# Patient Record
Sex: MALE | Employment: OTHER | URBAN - METROPOLITAN AREA
[De-identification: names, ages, dates, MRNs, and addresses within clinical notes are randomized per-mention and may not be internally consistent; named-entity substitution may affect disease eponyms.]

---

## 2018-01-23 ENCOUNTER — HOSPITAL ENCOUNTER (EMERGENCY)
Age: 38
Discharge: HOME | End: 2018-01-23
Attending: EMERGENCY MEDICINE
Payer: MEDICARE

## 2018-01-23 ENCOUNTER — APPOINTMENT (OUTPATIENT)
Dept: GENERAL RADIOLOGY | Age: 38
End: 2018-01-23
Attending: EMERGENCY MEDICINE
Payer: MEDICARE

## 2018-01-23 VITALS
TEMPERATURE: 97.7 F | SYSTOLIC BLOOD PRESSURE: 141 MMHG | DIASTOLIC BLOOD PRESSURE: 68 MMHG | BODY MASS INDEX: 39.28 KG/M2 | WEIGHT: 290 LBS | HEIGHT: 72 IN | HEART RATE: 74 BPM | OXYGEN SATURATION: 100 % | RESPIRATION RATE: 20 BRPM

## 2018-01-23 DIAGNOSIS — M70.51 INFRAPATELLAR BURSITIS OF RIGHT KNEE: Primary | ICD-10-CM

## 2018-01-23 PROCEDURE — A9270 NON-COVERED ITEM OR SERVICE: HCPCS | Performed by: EMERGENCY MEDICINE

## 2018-01-23 PROCEDURE — 73564 X-RAY EXAM KNEE 4 OR MORE: CPT

## 2018-01-23 PROCEDURE — 74011636637 HC RX REV CODE- 636/637: Performed by: EMERGENCY MEDICINE

## 2018-01-23 PROCEDURE — 99283 EMERGENCY DEPT VISIT LOW MDM: CPT

## 2018-01-23 RX ORDER — PREDNISONE 20 MG/1
60 TABLET ORAL
Status: COMPLETED | OUTPATIENT
Start: 2018-01-23 | End: 2018-01-23

## 2018-01-23 RX ORDER — PREDNISONE 20 MG/1
60 TABLET ORAL DAILY
Qty: 15 TAB | Refills: 0 | Status: SHIPPED | OUTPATIENT
Start: 2018-01-23 | End: 2018-01-28

## 2018-01-23 RX ADMIN — PREDNISONE 60 MG: 20 TABLET ORAL at 08:01

## 2018-01-23 NOTE — ED TRIAGE NOTES
Presented to ED to be evaluated for reported right knee injury while using eliptical on yesterday. Patient reports pain as documented. Patient is able to ambulate with minimal difficulty noted. Patient denies any additional complaints at this time. Noted that patient has splint to left 1st and 2nd digits. Sepsis Screening completed    (  )Patient meets SIRS criteria. ( zx )Patient does not meet SIRS criteria.       SIRS Criteria is achieved when two or more of the following are present   Temperature < 96.8°F (36°C) or > 100.9°F (38.3°C)   Heart Rate > 90 beats per minute   Respiratory Rate > 20 breaths per minute   WBC count > 12,000 or <4,000 or > 10% bands

## 2018-01-23 NOTE — DISCHARGE INSTRUCTIONS
Bursitis: Care Instructions  Your Care Instructions    A bursa is a small sac of fluid that helps the tissues around a joint slide over one another easily. Injury or overuse of a joint can cause pain, redness, and inflammation in the bursa (bursitis). Bursitis usually gets better if you avoid the activity that caused it. You can help prevent bursitis from coming back by doing stretching and strengthening exercises. You may also need to change the way you do some activities. Follow-up care is a key part of your treatment and safety. Be sure to make and go to all appointments, and call your doctor if you are having problems. It's also a good idea to know your test results and keep a list of the medicines you take. How can you care for yourself at home? · Put ice or a cold pack on the area for 10 to 20 minutes at a time. Try to do this every 1 to 2 hours for the next 3 days (when you are awake) or until the swelling goes down. Put a thin cloth between the ice and your skin. · After the 3 days of using ice, you may use heat on the area. You can use a hot water bottle; a warm, moist towel; or a heating pad set on low. You can also try alternating heat and ice. · Rest the area where you have pain. Stop any activities that cause pain. Switch to activities that do not stress the area. · Take pain medicines exactly as directed. ¨ If the doctor gave you a prescription medicine for pain, take it as prescribed. ¨ If you are not taking a prescription pain medicine, ask your doctor if you can take an over-the-counter medicine. ¨ Do not take two or more pain medicines at the same time unless the doctor told you to. Many pain medicines have acetaminophen, which is Tylenol. Too much acetaminophen (Tylenol) can be harmful. · To prevent stiffness, gently move the joint as much as you can without pain every day. As the pain gets better, keep doing range-of-motion exercises.  Ask your doctor for exercises that will make the muscles around the joint stronger. Do these as directed. · You can slowly return to the activity that caused the pain, but do it with less effort until you can do it without pain or swelling. Be sure to warm up before and stretch after you do the activity. When should you call for help? Call your doctor now or seek immediate medical care if:  ? · You have new or worse symptoms of infection, such as:  ¨ Increased pain, swelling, warmth, or redness. ¨ Red streaks leading from the area. ¨ Pus draining from the area. ¨ A fever. ? Watch closely for changes in your health, and be sure to contact your doctor if:  ? · You do not get better as expected. Where can you learn more? Go to http://evonne-michelle.info/. Enter G779 in the search box to learn more about \"Bursitis: Care Instructions. \"  Current as of: March 21, 2017  Content Version: 11.4  © 3990-5550 [employer]. Care instructions adapted under license by [employer] (which disclaims liability or warranty for this information). If you have questions about a medical condition or this instruction, always ask your healthcare professional. [place] [address] any warranty or liability for your use of this information. Overuse and Repetitive Motion Injuries: Care Instructions  Your Care Instructions    When you use one part of your body in the same way over and over again, it can put too much stress on your joints, muscles, or other tissues. This can cause an overuse injury. You may have pain, swelling, or tenderness in that part of your body. There are many different kinds of overuse injuries. You could get one from doing a sport or activity. Or you could get one from something you do at work. For example, you may get elbow pain from frequent lifting. Or you may get wrist pain from typing all day. Follow-up care is a key part of your treatment and safety.  Be sure to make and go to all appointments, and call your doctor if you are having problems. It's also a good idea to know your test results and keep a list of the medicines you take. How can you care for yourself at home? · Take pain medicines exactly as directed. ¨ If the doctor gave you a prescription medicine for pain, take it as prescribed. ¨ If you are not taking a prescription pain medicine, ask your doctor if you can take an over-the-counter medicine. · Put ice or a cold pack on the area for 10 to 20 minutes at a time to ease pain. Put a thin cloth between the ice and your skin. · If your doctor gave you a splint, use it exactly as directed. · Ask your doctor about physical or occupational therapy. These can help you learn how to do tasks differently. · Return to your usual activities slowly. · Rest the area that hurts. You may need to stop or reduce the activity that causes your symptoms. Then you can return to it slowly. When should you call for help? Watch closely for changes in your health, and be sure to contact your doctor if:  ? · Your symptoms are getting worse. ? · You have new symptoms, such as numbness or weakness. ? · You do not get better as expected. Where can you learn more? Go to http://evonne-michelle.info/. Enter H328 in the search box to learn more about \"Overuse and Repetitive Motion Injuries: Care Instructions. \"  Current as of: March 21, 2017  Content Version: 11.4  © 8151-6466 [employer]. Care instructions adapted under license by [employer] (which disclaims liability or warranty for this information). If you have questions about a medical condition or this instruction, always ask your healthcare professional. [address] any warranty or liability for your use of this information.

## 2018-01-23 NOTE — ED PROVIDER NOTES
[address]  EMERGENCY DEPARTMENT HISTORY AND PHYSICAL EXAM    Date: 1/23/2018  Patient Name: Jeniffer Nixon  YOB: 1980  Medical Record Number: [id number]     History of Presenting Illness     Chief Complaint   Patient presents with    Knee Pain       History Provided By: Patient    Chief Complaint: Right knee injury  Duration: 1 Days  Timing:  Acute  Location: Lateral infrapatellar right knee  Quality: Aching and Sharp  Severity: 9 out of 10  Modifying Factors: Pain is worse with bending right knee  Associated Symptoms: Right knee pain and swelling    Additional History (Context):   7:01 AM  Jeniffer Nixon is a 40 y.o. male who presents to the emergency department C/O right knee injury onset yesterday. Associated symptoms include right knee swelling/pain. Pain is worse with bending right knee. Reports he was riding the elliptical and after finishing/stretching, pt began to feel a pain deep inside lateral infrapatellar right knee. Pt denies hearing a \"pop,\" right knee locking up, wound, color change, fhx lupus, fhx RA,  and any other Sx or complaints. Shx: -tobacco use, -EtOH use, -illicit drug use    PCP: PROVIDER UNKNOWN    Past History     Past Medical History:  History reviewed. No pertinent past medical history. Past Surgical History:  History reviewed. No pertinent surgical history. Family History:  History reviewed. No pertinent family history. Social History:  Social History   Substance Use Topics    Smoking status: Never Smoker    Smokeless tobacco: Never Used    Alcohol use No       Allergies:  No Known Allergies      Review of Systems     Review of Systems   Musculoskeletal: Positive for arthralgias (right knee) and joint swelling (right knee). Skin: Negative for color change and wound. All other systems reviewed and are negative.       Physical Exam     Vitals:    01/23/18 0612   BP: 141/68   Pulse: 74   Resp: 20   Temp: 97.7 °F (36.5 °C) SpO2: 100%   Weight: 131.5 kg (290 lb)   Height: 6' (1.829 m)     Physical Exam   Constitutional: He is oriented to person, place, and time. He appears well-developed and well-nourished. No distress. HENT:   Head: Normocephalic and atraumatic. Right Ear: External ear normal.   Left Ear: External ear normal.   Mouth/Throat: Oropharynx is clear and moist. No oropharyngeal exudate. Eyes: Conjunctivae and EOM are normal. Pupils are equal, round, and reactive to light. No scleral icterus. No pallor   Neck: Normal range of motion. Neck supple. No JVD present. No tracheal deviation present. No thyromegaly present. Cardiovascular: Normal rate, regular rhythm and normal heart sounds. Pulmonary/Chest: Effort normal and breath sounds normal. No stridor. No respiratory distress. Abdominal: Soft. Bowel sounds are normal. He exhibits no distension. There is no tenderness. There is no rebound and no guarding. Musculoskeletal: Normal range of motion. He exhibits no edema. Right knee: He exhibits swelling. He exhibits no ecchymosis, no erythema, no LCL laxity, normal patellar mobility and no MCL laxity. Tenderness found. Visible swelling to the lateral infrapatellar region of right knee. No displacement or ballottement of the patella. No valgus or varus laxity or pain. No Landry's. No posterior or anterior drawer sign. Lymphadenopathy:     He has no cervical adenopathy. Neurological: He is alert and oriented to person, place, and time. He has normal reflexes. No cranial nerve deficit. Coordination normal.   Skin: Skin is warm and dry. No rash noted. He is not diaphoretic. No erythema. Psychiatric: He has a normal mood and affect. His behavior is normal. Judgment and thought content normal.   Nursing note and vitals reviewed. Diagnostic Study Results     Labs -   No results found for this or any previous visit (from the past 12 hour(s)).     Radiologic Studies -     RADIOLOGY FINDINGS  Right knee X-ray shows on lateral view of right knee, there is a prominent lucency of tibial tuberosity, which does not appear to be a fx. Pending review by Radiologist  Recorded by JOAQUIN Sy, as dictated by Thai Baeza MD    XR KNEE RT MIN 4 V    (Results Pending)     CT Results  (Last 48 hours)    None        CXR Results  (Last 48 hours)    None          Medications Given in the ED:  Medications   predniSONE (DELTASONE) tablet 60 mg (not administered)        Medical Decision Making     I am the first provider for this patient. I reviewed the vital signs, available nursing notes, past medical history, past surgical history, family history and social history. Records Reviewed: Nursing Notes    Vital Signs-Reviewed the patient's vital signs. Patient Vitals for the past 12 hrs:   Temp Pulse Resp BP SpO2   01/23/18 0612 97.7 °F (36.5 °C) 74 20 141/68 100 %       Provider Notes (Medical Decision Making):   DDx: bursitis more likely than ligamentous injury, arthritis or bony fx. Pulse Oximetry Analysis - Normal 100% on RA       ED Course:   7:01 AM Initial assessment performed. The patients presenting problems have been discussed, and they are in agreement with the care plan formulated and outlined with them. Offering no questions or concerns at this time. Diagnosis and Disposition       Discharge Note:  7:25 AM  Shagufta Mares's  results have been reviewed with him. He has been counseled regarding his diagnosis, treatment, and plan. He verbally conveys understanding and agreement of the signs, symptoms, diagnosis, treatment and prognosis and additionally agrees to follow up as discussed. He also agrees with the care-plan and conveys that all of his questions have been answered.   I have also provided discharge instructions for him that include: educational information regarding their diagnosis and treatment, and list of reasons why they would want to return to the ED prior to their follow-up appointment, should his condition change. He has been provided with education for proper emergency department utilization. Clinical Impression:    1. Infrapatellar bursitis of right knee        PLAN:  1. D/C Home  2. Current Discharge Medication List      START taking these medications    Details   predniSONE (DELTASONE) 20 mg tablet Take 3 Tabs by mouth daily for 5 days. With Breakfast  Qty: 15 Tab, Refills: 0           3. Follow-up Information     Follow up With Details Comments Milla Antonio MD Schedule an appointment as soon as possible for a visit in 2 days For primary care follow up [address] [place] [address]  [phone]      [facility] EMERGENCY DEPT Go to As needed, if symptoms worsen [address] [place] [address]  [phone]        _______________________________    Attestations: This note is prepared by Rachid Biggs, acting as Scribe for Aditya. Starla Jaeger MD.    Aditya. Starla Jaeger MD:  The scribe's documentation has been prepared under my direction and personally reviewed by me in its entirety.   I confirm that the note above accurately reflects all work, treatment, procedures, and medical decision making performed by me.  _______________________________

## 2018-09-09 ENCOUNTER — APPOINTMENT (OUTPATIENT)
Dept: GENERAL RADIOLOGY | Age: 38
End: 2018-09-09
Attending: PHYSICIAN ASSISTANT
Payer: MEDICARE

## 2018-09-09 ENCOUNTER — HOSPITAL ENCOUNTER (EMERGENCY)
Age: 38
Discharge: HOME | End: 2018-09-09
Attending: EMERGENCY MEDICINE
Payer: MEDICARE

## 2018-09-09 VITALS
TEMPERATURE: 98.2 F | WEIGHT: 271 LBS | RESPIRATION RATE: 16 BRPM | DIASTOLIC BLOOD PRESSURE: 86 MMHG | OXYGEN SATURATION: 98 % | BODY MASS INDEX: 38.8 KG/M2 | HEART RATE: 58 BPM | SYSTOLIC BLOOD PRESSURE: 137 MMHG | HEIGHT: 70 IN

## 2018-09-09 DIAGNOSIS — S80.01XA CONTUSION OF RIGHT KNEE, INITIAL ENCOUNTER: Primary | ICD-10-CM

## 2018-09-09 DIAGNOSIS — Z87.39 HISTORY OF BURSITIS: ICD-10-CM

## 2018-09-09 PROCEDURE — 73564 X-RAY EXAM KNEE 4 OR MORE: CPT

## 2018-09-09 PROCEDURE — 99282 EMERGENCY DEPT VISIT SF MDM: CPT

## 2018-09-09 RX ORDER — PREDNISONE 10 MG/1
TABLET ORAL
Qty: 21 TAB | Refills: 0 | Status: SHIPPED | OUTPATIENT
Start: 2018-09-09 | End: 2020-04-09 | Stop reason: ALTCHOICE

## 2018-09-09 RX ORDER — TRAMADOL HYDROCHLORIDE 50 MG/1
50 TABLET ORAL
Qty: 20 TAB | Refills: 0 | Status: SHIPPED | OUTPATIENT
Start: 2018-09-09 | End: 2020-04-09 | Stop reason: ALTCHOICE

## 2018-09-10 NOTE — DISCHARGE INSTRUCTIONS

## 2018-09-10 NOTE — ED PROVIDER NOTES
EMERGENCY DEPARTMENT HISTORY AND PHYSICAL EXAM 
 
Date: 9/9/2018 Patient Name: Candice Mcconnell History of Presenting Illness Chief Complaint Patient presents with  Knee Pain History Provided By: Patient Chief Complaint: Knee pain Duration: 1 Days Timing:  Acute Location: Right knee Quality: Abner Garcia Severity: 8 out of 10 Modifying Factors: No modifying factors Associated Symptoms: Bruising to front of right knee Additional History (Context):  
10:22 PM 
Candice Mcconnell is a 40 y.o. male with no pertinent PMHX who presents to the emergency department C/O right knee pain onset 1 day ago. Associated sxs include bruising to front on right knee. Pt states that he was knelling on his knee today when the pain started. Pt states that he has been seen for similar knee pain and was dx with an inflamed bursa. Pt states that he was given a steroid for 6 days and followed up with his PCP who gave him a Cortizone shot. Pt denies fever, chills, recent fall or injury, and any other sxs or complaints. PCP: PROVIDER UNKNOWN 
 
 
 
Past History Past Medical History: 
Past Medical History:  
Diagnosis Date  Psychiatric disorder Past Surgical History: 
Past Surgical History:  
Procedure Laterality Date  HX HERNIA REPAIR Family History: 
History reviewed. No pertinent family history. Social History: 
Social History Substance Use Topics  Smoking status: Never Smoker  Smokeless tobacco: Never Used  Alcohol use Yes Allergies: Allergies Allergen Reactions  Ancef [Cefazolin] Rash Review of Systems Review of Systems Constitutional: Negative for chills and fever. Musculoskeletal: Positive for arthralgias (right knee). Skin: Positive for color change (bruising to front of right knee). All other systems reviewed and are negative. Physical Exam  
 
Vitals:  
 09/09/18 2237 BP: 137/86 Pulse: (!) 58 Resp: 16 Temp: 98.2 °F (36.8 °C) SpO2: 98% Weight: 122.9 kg (271 lb) Height: 5' 10\" (1.778 m) Physical Exam  
Constitutional: He is oriented to person, place, and time. He appears well-developed and well-nourished. No distress. HENT:  
Head: Normocephalic and atraumatic. Eyes: Conjunctivae and EOM are normal. Pupils are equal, round, and reactive to light. Neck: Normal range of motion. Neck supple. Musculoskeletal: Normal range of motion. He exhibits tenderness. He exhibits no edema or deformity. Right hip: Normal.  
     Right knee: He exhibits ecchymosis. He exhibits normal range of motion, no laceration, no erythema, normal alignment and no LCL laxity. Tenderness found. Right ankle: Normal. He exhibits normal pulse. Right lower leg: Normal.  
     Legs: 
Neurological: He is alert and oriented to person, place, and time. Skin: Skin is warm and dry. Psychiatric: He has a normal mood and affect. His behavior is normal.  
Nursing note and vitals reviewed. Diagnostic Study Results Labs - No results found for this or any previous visit (from the past 12 hour(s)). Radiologic Studies -  
XR KNEE RT MIN 4 V    (Results Pending) RADIOLOGY FINDINGS Right knee X-ray shows no acute process Pending review by Radiologist 
Recorded by [facility], ED Scribe, as dictated by Aaron Loyola PA-C 
 
CT Results  (Last 48 hours) None CXR Results  (Last 48 hours) None Medications given in the ED- Medications - No data to display Medical Decision Making I am the first provider for this patient. I reviewed the vital signs, available nursing notes, past medical history, past surgical history, family history and social history. Vital Signs-Reviewed the patient's vital signs. Pulse Oximetry Analysis - 100% on RA Records Reviewed: Nursing Notes Procedures: 
Procedures ED Course:  
10:22 PM Initial assessment performed.  The patients presenting problems have been discussed, and they are in agreement with the care plan formulated and outlined with them. I have encouraged them to ask questions as they arise throughout their visit. Diagnosis and Disposition DISCHARGE NOTE: 
11:03 PM  
Nelson Mares's  results have been reviewed with him. He has been counseled regarding his diagnosis, treatment, and plan. He verbally conveys understanding and agreement of the signs, symptoms, diagnosis, treatment and prognosis and additionally agrees to follow up as discussed. He also agrees with the care-plan and conveys that all of his questions have been answered. I have also provided discharge instructions for him that include: educational information regarding their diagnosis and treatment, and list of reasons why they would want to return to the ED prior to their follow-up appointment, should his condition change. He has been provided with education for proper emergency department utilization. CLINICAL IMPRESSION: 
 
1. Contusion of right knee, initial encounter 2. History of bursitis PLAN: 
1. D/C Home 2. Current Discharge Medication List  
  
START taking these medications Details  
traMADol (ULTRAM) 50 mg tablet Take 1 Tab by mouth every six (6) hours as needed for Pain. Max Daily Amount: 200 mg. Qty: 20 Tab, Refills: 0 Associated Diagnoses: Contusion of right knee, initial encounter  
  
predniSONE (STERAPRED DS) 10 mg dose pack Use as directed Qty: 21 Tab, Refills: 0 Associated Diagnoses: History of bursitis 3. Follow-up Information Follow up With Details Comments Contact Info Kay Pike MD Schedule an appointment as soon as possible for a visit in 2 days For follow up with orthopedist [address] [place] [address] Rd [address] [facility] 
[phone] [facility] EMERGENCY DEPT Go to As needed, if symptoms worsen [address] [id number] [phone] _______________________________ Attestations: This note is prepared by Makayla Ramsey, acting as Scribe for KALIE Ledezma Ala, Ala, PA-C:  The scribe's documentation has been prepared under my direction and personally reviewed by me in its entirety. I confirm that the note above accurately reflects all work, treatment, procedures, and medical decision making performed by me. 
_______________________________

## 2020-04-09 ENCOUNTER — VIRTUAL VISIT (OUTPATIENT)
Dept: SURGERY | Age: 40
End: 2020-04-09

## 2020-04-09 VITALS
HEART RATE: 87 BPM | DIASTOLIC BLOOD PRESSURE: 89 MMHG | HEIGHT: 70 IN | WEIGHT: 302 LBS | BODY MASS INDEX: 43.23 KG/M2 | SYSTOLIC BLOOD PRESSURE: 137 MMHG

## 2020-04-09 DIAGNOSIS — E66.01 MORBID OBESITY WITH BMI OF 40.0-44.9, ADULT (HCC): ICD-10-CM

## 2020-04-09 DIAGNOSIS — E66.01 MORBID OBESITY (HCC): Primary | ICD-10-CM

## 2020-04-09 DIAGNOSIS — R73.03 PREDIABETES: ICD-10-CM

## 2020-04-09 DIAGNOSIS — E78.5 HYPERLIPIDEMIA, UNSPECIFIED HYPERLIPIDEMIA TYPE: ICD-10-CM

## 2020-04-09 DIAGNOSIS — F41.9 ANXIETY: ICD-10-CM

## 2020-04-09 DIAGNOSIS — F32.A DEPRESSION, UNSPECIFIED DEPRESSION TYPE: ICD-10-CM

## 2020-04-09 RX ORDER — LAMOTRIGINE 25 MG/1
25 TABLET, CHEWABLE ORAL DAILY
COMMUNITY

## 2020-04-09 NOTE — PROGRESS NOTES
Bariatric Surgery Consultation    Subjective: The patient is a 44 y.o. obese male with a Body mass index is 43.33 kg/m². .  The patient is at his heaviest weight for the past 2 years. he has been overweight since age 28.   he has been considering surgery since last year. he desires surgery at this time because of multiple health concerns and their lifestyle issues which are hindered by their weight. he has been referred by his family physician Dr Rajesh Coon for evaluation and treatment of their obesity via surgical intervention. Diogo Browne has tried multiple diets in his lifetime most recently tried physician supervised, behavior modification and Atkins    Bariatric comorbidities present are   Patient Active Problem List   Diagnosis Code    Morbid obesity ([facility] E66.01    Anxiety F41.9    Depression F32.9    Morbid obesity with BMI of 40.0-44.9, adult ([facility] E66.01, Z68.41    Prediabetes R73.03    Hyperlipidemia E78.5       The patient is considering laparoscopic sleeve gastrectomy for surgical weight loss due to their ineffective progress with medical forms of weight loss and the urging of their physician who cares for their primary medical issues. The patient  now presents  for consideration for weight loss surgery understanding the benefits of this over a medical approach of weight loss as was discussed in our presentation on weight loss surgery. They have discussed their plans both with their family and primary care physician who is in support of their pursuit of such. The patient has not had health issues as of late and denies and gastrointestinal disturbances other than what is outlined below in their review of symptoms. All of their prior evaluations available by both their PCP's and specialists physicians have been reviewed today either in the Care Everywhere portal or scanned under the media tab.     I have spent a large portion of my initial consultation today reviewing the patients current dietary habits which have contributed to their health issues and obesity. I have suggested to them personally a dietary regimen that they can initiate now to help with their status as it pertains to their weight. They understand that the most important aspect of their journey through their weight loss endeavor will be their adherence to a new lifestyle of healthy eating behavior. They also understand that an adherence to an exercise program will not only help with weight loss but is ultimately important in weight maintenance. The patients goal weight is 190lb. These goals are consistent with expected outcomes of their desired operation. his Medical goals are resolution of these health issues. Patient Active Problem List    Diagnosis Date Noted    Morbid obesity ([facility]     Anxiety     Depression     Morbid obesity with BMI of 40.0-44.9, adult ([facility]     Prediabetes     Hyperlipidemia      Past Surgical History:   Procedure Laterality Date    HX HERNIA REPAIR      HX WISDOM TEETH EXTRACTION        Social History     Tobacco Use    Smoking status: Never Smoker    Smokeless tobacco: Never Used   Substance Use Topics    Alcohol use: Yes      No family history on file. Current Outpatient Medications   Medication Sig Dispense Refill    lamoTRIgine (LaMICtal) 25 mg rapid dissolve tablet Take 25 mg by mouth daily.        Allergies   Allergen Reactions    Ancef [Cefazolin] Rash          Review of Systems:       General - No history or complaints of unexpected fever, chills, or weight loss  Head/Neck - No history or complaints of headache, diplopia, dysphagia, hearing loss  Cardiac - No history or complaints of chest pain, palpitations, murmur, or shortness of breath  Pulmonary - No history or complaints of shortness of breath, productive cough, hemoptysis  Gastrointestinal - mild reflux,no  abdominal pain, obstipation/constipation or blood per rectum  Genitourinary - No history or complaints of hematuria/dysuria, stress urinary incontinence symptoms, or renal lithiasis  Musculoskeletal - no joint pain ,  no muscular weakness  Hematologic - No history or complaints of bleeding disorders,  No blood transfusions  Neurologic - No history or complaints of  migraine headaches, seizure activity, syncopal episodes, TIA or stroke  Integumentary - No history or complaints of rashes, abnormal nevi, skin cancer  Gynecological - n/a               Objective:     Visit Vitals  /89   Pulse 87   Ht 5' 10\" (1.778 m)   Wt 137 kg (302 lb)   BMI 43.33 kg/m²       Physical Examination:       Physical Examination: General appearance - alert, well appearing, and in no distress and oriented to person, place, and time  Mental status - alert, oriented to person, place, and time, normal mood, behavior, speech, dress, motor activity, and thought processes  Eyes - pupils equal and reactive, extraocular eye movements intact, sclera anicteric, left eye normal, right eye normal  Ears - right ear normal, left ear normal  Neck- good extension and flexion, no obvious swelling  Chest - good air movement  Heart - N/A  Abdomen - no obvious distension, scars as noted. Neurological - alert, oriented, normal speech, no focal findings or movement disorder noted  Musculoskeletal - no swelling noted  Extremities - normal movement    Labs:       No results found for this or any previous visit (from the past 1440 hour(s)). Assessment:     Morbid obesity with comorbidity    Plan:     laparoscopic sleeve gastrectomy    This is a 44 y.o. male with a BMI of Body mass index is 43.33 kg/m². and the weight-related co-morbidties as noted below. Lorena Quan meets the NIH criteria for bariatric surgery based upon the BMI of Body mass index is 43.33 kg/m². and multiple weight-related co-morbidties.  Lorena Quan has elected laparoscopic sleeve gastrectomy as his intervention of choice for treatment of morbid obestiy through surgical means secondary to its safety profile, rapid return to work  and decreases in operative risks over gastric bypass. In the office today, following Julito's history and physical examination, a 30 minute discussion regarding the anatomic alterations for the laparoscopic sleeve gastrectomy was undertaken. The dietary expectations and the patient and physician dependent factors for success were thoroughly discussed, to include the need for interval follow-up and long-term dietary changes associated with success. The possible complications of the sleeve gastrectomy  were also discussed, to include;death, DVT/PE, staple line leak, bleeding, stricture formation, infection, nutritional deficiencies and sleeve dilation. Specific weight related outcomes for success were also discussed with an emphasis on careful and close follow-up with the first year and eating behavior modification as the baseline and cyclical hunger return. The patient expressed an understanding of the above factors, and his questions were answered in their entirety. In addition, the patient attended a 1.5 hour power point seminar regarding obesity, surgical weight loss including, adjustable gastric band, gastric bypass, and sleeve gastrectomy. This discussion contrasted the different surgical techniques, mechanisms of actions and expected outcomes, and surgical and medical risks associated with each procedure. During this seminar, there was a long question and answer session where each questions was answered until there were no additional questions. Today, the patient had all of his questions answered and desires to proceed with  bariatric surgery initially choosing sleeve gastrectomy as his surgical option. Secondary Diagnoses:     Dietary Intervention  - The patient is currently scheduled to see or has been followed by a bariatric nutritionist for an attempt at preoperative weight loss as has been dictated by their insurance carrier.     They will be assessed at various times during their follow up to evaluate their progress depending on the length of time that is required once again by their carrier. I have explained the importance of   preoperative weight loss and the benefits regarding lower surgical risk and also assisting the patient in reaching their weight loss goal. They understand also that they should participate in an  exercise program to assist in this weight loss. Finally they understand there is a physiologic benefit from the standpoint of hepatic volume reduction and reduction of central visceral adiposity   preoperatively. I have reiterated the importance of a low carbohydrate and high protein regimen to achieve their   stated goal. I have reviewed their current eating behavior prior to this encounter and explained to them in an exhaustive fashion the appropriate diet that they should adhere to. They have been   encouraged to loose weight pre operatively and understand it is our prerogative to cancel surgery or postpone their procedure in the event of significant weight gain. Hyperlipidemia - The patient understands that studies show that almost all patient will realize an improvement in their lipid profile with weight loss that occurs with these procedures. They however also understand that hyperlipidemia is a multifactorial disease particularly as it pertains to their genetic background and that there is no guarantee toward cure  of this   issue. We will resume their medications both pre operatively and immediately postoperatively as this tends to decrease any post operative cardiac events. The patient will follow up   with their family physician in the postoperative period with plans to repeat their lipid panel 2-3 month postoperative for potential adjustment or removal of these medications.     Adult Onset Diabetes - The patient has tanner given a very low carbohydrate diet preoperatively along with instructions to monitor their blood sugars on a regular   daily basis. When  their surgery is performed  we will be monitoring the patient with sliding scale insulin and accuchecks. Based on those values we will determine   whether the patient needs a reduction of those medications postoperatively or total removal of those medications on discharge. We will have the patient continue   accuchecks postoperatively while at home also and report to me or their family physician for appropriate adjustments as needed. The patient also understands   that in the event of uncontrolled blood sugar preoperatively that we may choose to postpone their surgery. GERD -The patient understands that weight loss surgery is not a guaranteed cure for reflux disease but does understand the benefits that weight loss can have on reflux disease. They also understand that at the time of surgery the gastroesophageal junction will be evaluated for the presence of a diaphragmatic hernia. Hernias will be corrected always with the surgical procedure if possible and is deemed safe. The patient also understands that neither weight loss surgery nor repair of a diaphragmatic hernia repair guarantees the complete cessation of the disease. They also understand there is a possibility of recurrence of these hernias with a simple crural repair as is performed with these procedures. They understand they may have to continue their medications in the postoperative period. They have a good understanding that the gastric bypass procedure is better suited to total resolution of this issue and that neither the Lap Band nor sleeve gastrectomy is considered a curative procedure as it pertains to this diagnosis. Signed By: Mor Olivier MD     April 9, 2020       This visit with Avi Sever was performed under virtual telemedicine guidelines during the coronavirus ([id number]-53) public health emergency on 4/9/2020 in an interactive   fashion using [employer] me.   They understand that this telemedicine encounter is a billable service, with coverage determined by their insurance carrier. They are aware that   they may receive a bill and have provided verbal consent for this virtual visit. This visit was performed with the patient in their home environment and provider was   present at [facility]. I have spent over 60 minutes on this visit  both prior to the visits reviewing the patients chart and with the patient face to face. I have reviewed their medical history, performed a telemedicine physical examination, and discussed the plan of action to date. They understand that they will be asked   to come to the office when our office is allowed normal patient interaction, as dictated by public health officials, for a face-to-face visit to rediscuss all of the things we  have talked about today. During this visit we discussed the varieties of surgeries that we perform, how they would impact the patient from a weight loss standpoint   considering their medical issues and prior surgeries, and also the restrictions that the patient would have long-term with the operation that they have chosen.     Wendall Nyhan M.D.  4/9/2020

## 2020-04-09 NOTE — PATIENT INSTRUCTIONS

## 2020-05-04 ENCOUNTER — CLINICAL SUPPORT (OUTPATIENT)
Dept: SURGERY | Age: 40
End: 2020-05-04

## 2020-05-04 VITALS — BODY MASS INDEX: 43.23 KG/M2 | WEIGHT: 302 LBS | HEIGHT: 70 IN

## 2020-05-04 DIAGNOSIS — E66.01 MORBID OBESITY WITH BMI OF 40.0-44.9, ADULT (HCC): Primary | ICD-10-CM

## 2020-05-04 NOTE — PROGRESS NOTES
Medical Weight Loss Multi-Disciplinary Program    Name: Sharif Tam   : 1980    Session# 1  Date: 2020     Height: 5' 10\" (177.8 cm)    Weight: 137 kg (302 lb) lbs. Body mass index is 43.33 kg/m². Dietitian: Salvatore Lin is a 44 y.o. male who present for a pre-op evaluation. Visit Vitals  Ht 5' 10\" (1.778 m)   Wt 137 kg (302 lb)   BMI 43.33 kg/m²     Past Medical History:   Diagnosis Date    Anxiety     Depression     Diabetes ([facility]     GERD (gastroesophageal reflux disease)     Hyperlipidemia     Morbid obesity ([facility]     Morbid obesity with BMI of 40.0-44.9, adult ([facility]     Prediabetes            Procedure:  laparoscopic sleeve gastrectomy     Reasons for Surgery:  BMI > 40 with one or more medically significant comorbidities    Summary:  Pt given brief pre/post-op diet ed and diet hx reviewed. Pt instructed to follow a low calorie, low carbohydrate, high protein diet of about 4767-0035 calories daily. Pt set several goals. See below. What have you done in the past to try to lose weight? Calorie controlled diet and exercise programs    Why didn't you lose weight or keep the weight off?: Patient found that within the last 2 years he has not been able to lose weight with diet or exercise    Why do you think having weight loss surgery will make it possible for you to lose weight and keep it off? Patient feels that his weight is out of control and he feels that surgery will serve as a tool to help him control appetite and portions. Dietary Instructions    Nutritional Hx: What is the number of meals you eat per day? 2  Comment: SKIPS breakfast OR lunch most day     Do you eat between meals / snack? Yes    How fast do you eat your meals? rapid    How often do you eat fast food? 4 times a week    How many sodas/sugared beverages do you drink per day? Regular soda 3-4 times per week, reduced from daily cosumption    How many caffeinated drinks do you have per day? soda    How much milk and/or juice do you drink per day? None     How much water do you drink per day? 8-10 (8oz glasses)    How often do you consume alcohol? occasionally; Beer every 3-4 months     Current Vitamins:     Diet History:    Typical intake is as follows:  Breakfast: SKIPS  Lunch: Ensure protein supplement OR [place] [facility] sandwich with fruit and chips   Dinner: Restaurant salad with ham with no salad dressing   Snacks: Goldfish, crackers, chips, candy- often times reactive/stress eats   Fluids: Water - 5-6 bottles water per day, Regular OR diet pepsi 3-4 times per week     Reviewed intake  Understanding low carbohydrates, low sugar, higher protein meals  Understanding proper portions  Dining outside home  Instruction given for personal dietary changes  Discussed perceived compliance  Comments: Pt given brief pre/post-op diet ed and diet hx reviewed. Patient Education and Materials Provided:  Supplement [facility], B Vitamin Information, MVI Recommendations, Calcium Citrate Information, Bariatric Supplement Companies, Protein Supplement Information, Fluid Requirements, No Caffeine or Carbonation, No Alcohol for One Year Post Op, 3 Balanced Meals a Day, Food Group Guide, Good Choices Dining Out, No Snacks, No Concentrated Sweets, Support System at Home, Exercising, Support Group Information and Addressed Current Habits / Changes to make  Physical Activity/Exercise    Discussed Perceived Compliance  Reasonable Goals Set  Motivation  Comments: none    Exercise:  Do you currently have an exercise routine? yes - patient has been walking 4 days per week, 30-45 minutes     Goals:   1. Work to increase to 3-4 small meals per day, with planned snacks as needed. Recommend following plate method for meal planning - focusing on lean protein, non-starchy vegetables, and measured amounts of starch. - Goal of  g protein and  g carbohydrate per day.                - Recommend continuing protein supplement as meal replacement at least 1x/day  2. Increase non caloric fluid to 64 oz per day. Eliminate caffeine, added sugar, carbonation, and straws.               -Continue to work to decrease sugar sweetened beverages - goal of calorie free beverages only              -Must eliminate caffeine prior to surgery and avoid for ~6-8 weeks  3. Start activity regimen, work to increase ADL              -Try to start walking for at least 30-45  minutes 4-5 days per week  4. Start Complete MVI    Candidate for surgery (per RD): PENDING  Tonja Herr RD  05/04/20